# Patient Record
Sex: MALE | Race: WHITE | NOT HISPANIC OR LATINO | URBAN - METROPOLITAN AREA
[De-identification: names, ages, dates, MRNs, and addresses within clinical notes are randomized per-mention and may not be internally consistent; named-entity substitution may affect disease eponyms.]

---

## 2024-03-09 ENCOUNTER — EMERGENCY (EMERGENCY)
Facility: HOSPITAL | Age: 29
LOS: 1 days | Discharge: HOME | End: 2024-03-09
Attending: EMERGENCY MEDICINE | Admitting: EMERGENCY MEDICINE
Payer: COMMERCIAL

## 2024-03-09 VITALS
WEIGHT: 130.07 LBS | HEIGHT: 65 IN | OXYGEN SATURATION: 97 % | DIASTOLIC BLOOD PRESSURE: 69 MMHG | RESPIRATION RATE: 17 BRPM | SYSTOLIC BLOOD PRESSURE: 117 MMHG | HEART RATE: 78 BPM | TEMPERATURE: 98 F

## 2024-03-09 VITALS
HEART RATE: 69 BPM | TEMPERATURE: 97 F | OXYGEN SATURATION: 98 % | DIASTOLIC BLOOD PRESSURE: 60 MMHG | SYSTOLIC BLOOD PRESSURE: 102 MMHG | RESPIRATION RATE: 17 BRPM

## 2024-03-09 DIAGNOSIS — R22.0 LOCALIZED SWELLING, MASS AND LUMP, HEAD: ICD-10-CM

## 2024-03-09 DIAGNOSIS — K11.20 SIALOADENITIS, UNSPECIFIED: ICD-10-CM

## 2024-03-09 LAB
ANION GAP SERPL CALC-SCNC: 11 MMOL/L — SIGNIFICANT CHANGE UP (ref 5–17)
BUN SERPL-MCNC: 18 MG/DL — SIGNIFICANT CHANGE UP (ref 7–23)
CALCIUM SERPL-MCNC: 9.6 MG/DL — SIGNIFICANT CHANGE UP (ref 8.4–10.5)
CHLORIDE SERPL-SCNC: 104 MMOL/L — SIGNIFICANT CHANGE UP (ref 96–108)
CO2 SERPL-SCNC: 26 MMOL/L — SIGNIFICANT CHANGE UP (ref 22–31)
CREAT SERPL-MCNC: 0.98 MG/DL — SIGNIFICANT CHANGE UP (ref 0.5–1.3)
EGFR: 108 ML/MIN/1.73M2 — SIGNIFICANT CHANGE UP
GLUCOSE SERPL-MCNC: 117 MG/DL — HIGH (ref 70–99)
HCT VFR BLD CALC: 39.6 % — SIGNIFICANT CHANGE UP (ref 39–50)
HGB BLD-MCNC: 13.4 G/DL — SIGNIFICANT CHANGE UP (ref 13–17)
MCHC RBC-ENTMCNC: 32.6 PG — SIGNIFICANT CHANGE UP (ref 27–34)
MCHC RBC-ENTMCNC: 33.8 GM/DL — SIGNIFICANT CHANGE UP (ref 32–36)
MCV RBC AUTO: 96.4 FL — SIGNIFICANT CHANGE UP (ref 80–100)
NRBC # BLD: 0 /100 WBCS — SIGNIFICANT CHANGE UP (ref 0–0)
PLATELET # BLD AUTO: 303 K/UL — SIGNIFICANT CHANGE UP (ref 150–400)
POTASSIUM SERPL-MCNC: 3.9 MMOL/L — SIGNIFICANT CHANGE UP (ref 3.5–5.3)
POTASSIUM SERPL-SCNC: 3.9 MMOL/L — SIGNIFICANT CHANGE UP (ref 3.5–5.3)
RBC # BLD: 4.11 M/UL — LOW (ref 4.2–5.8)
RBC # FLD: 12.3 % — SIGNIFICANT CHANGE UP (ref 10.3–14.5)
SODIUM SERPL-SCNC: 141 MMOL/L — SIGNIFICANT CHANGE UP (ref 135–145)
WBC # BLD: 9.27 K/UL — SIGNIFICANT CHANGE UP (ref 3.8–10.5)
WBC # FLD AUTO: 9.27 K/UL — SIGNIFICANT CHANGE UP (ref 3.8–10.5)

## 2024-03-09 PROCEDURE — 80048 BASIC METABOLIC PNL TOTAL CA: CPT

## 2024-03-09 PROCEDURE — 96365 THER/PROPH/DIAG IV INF INIT: CPT | Mod: XU

## 2024-03-09 PROCEDURE — 70491 CT SOFT TISSUE NECK W/DYE: CPT | Mod: 26,MC

## 2024-03-09 PROCEDURE — 99285 EMERGENCY DEPT VISIT HI MDM: CPT

## 2024-03-09 PROCEDURE — 70491 CT SOFT TISSUE NECK W/DYE: CPT | Mod: MC

## 2024-03-09 PROCEDURE — 85027 COMPLETE CBC AUTOMATED: CPT

## 2024-03-09 PROCEDURE — 99284 EMERGENCY DEPT VISIT MOD MDM: CPT | Mod: 25

## 2024-03-09 PROCEDURE — 36415 COLL VENOUS BLD VENIPUNCTURE: CPT

## 2024-03-09 RX ORDER — DEXAMETHASONE 0.5 MG/5ML
10 ELIXIR ORAL ONCE
Refills: 0 | Status: COMPLETED | OUTPATIENT
Start: 2024-03-09 | End: 2024-03-09

## 2024-03-09 RX ADMIN — Medication 10 MILLIGRAM(S): at 04:03

## 2024-03-09 RX ADMIN — Medication 1 TABLET(S): at 04:03

## 2024-03-09 RX ADMIN — Medication 102 MILLIGRAM(S): at 02:36

## 2024-03-09 NOTE — ED ADULT NURSE NOTE - OBJECTIVE STATEMENT
28yM presents to ED with R jaw/facial swelling that started this evening (within the last 2 hours). States "maybe I am having an allergic reaction." Denies any itchiness, rash, or any known allergies. Pt states "it's definitely harder to swallow than before", speaking in full sentences with NAD. Tongue is not swollen.

## 2024-03-09 NOTE — ED PROVIDER NOTE - CLINICAL SUMMARY MEDICAL DECISION MAKING FREE TEXT BOX
right facial swelling, no airway compromise, no resp distress, no voice changes, no crepitus, no tracheal deviation. doubt allergic reaction. possible sialolithiasis, possible abscess  -check labs  -CT  -decadron

## 2024-03-09 NOTE — ED PROVIDER NOTE - PATIENT PORTAL LINK FT
You can access the FollowMyHealth Patient Portal offered by [facility] by registering at the following website: [email or web address]. By joining [facility] FollowMyHealth portal, you will also be able to view your health information using other applications (apps) compatible with our system.

## 2024-03-09 NOTE — ED ADULT NURSE NOTE - NSFALLCONCLUSION_ED_ALL_ED
[employer] radiology results need for outpatient follow-up/return to ED if symptoms worsen, persist or questions arise/radiology results

## 2024-03-09 NOTE — ED PROVIDER NOTE - OBJECTIVE STATEMENT
28M no PMH c/o right facial swelling. pt states started suddenly tonight. states started after he was eating starbursts. states felt like it was pushing on his neck. no drooling, no SOB. no vomiting. no fevers. no trauma.

## 2024-03-09 NOTE — ED PROVIDER NOTE - NSFOLLOWUPINSTRUCTIONS_ED_ALL_ED_FT
Parotitis    Parotitis is inflammation of one or both of the parotid glands. These glands produce saliva. They are found on each side of the face, below and in front of the earlobes. The saliva that they produce comes out of tiny openings (ducts) inside the cheeks.    Parotitis may cause sudden swelling and pain (acute parotitis). It can also cause repeated episodes of swelling and pain or continued swelling that may or may not be painful (chronic parotitis).    What are the causes?  This condition may be caused by:  Infections from bacteria.  Infections from viruses, such as mumps or HIV.  Blockage (obstruction) of saliva flow through the parotid glands. This can be from a stone, scar tissue, or a tumor.  Diseases that cause your body's defense system (immune system) to attack healthy cells in your salivary glands. These are called autoimmune diseases.    What increases the risk?  You are more likely to develop this condition if:  You are 50 years old or older.  You do not drink enough fluids (are dehydrated).  You drink too much alcohol.  You have:  A dry mouth.  Diabetes.  Gout.  A long-term illness.  You do not take good care of your mouth and teeth (poor oral hygiene).  You have had radiation treatments to the head and neck.  You take certain medicines.    What are the signs or symptoms?  Symptoms of this condition depend on the cause. Symptoms may include:  Swelling under and in front of the ear. This may get worse after eating.  Pain and tenderness over the parotid gland. This may get worse after eating.  Redness and warmth of the skin over the parotid gland.  Fever or chills.  Pus coming from the ducts inside the mouth.  Dry mouth.  A bad taste in the mouth.    How is this diagnosed?  This condition may be diagnosed based on:  Your medical history.  A physical exam.  Tests to find the cause of the parotitis. These may include:  Doing blood tests to check for an autoimmune disease or infections from a virus.  Taking a fluid sample from the parotid gland and testing it for infection.  Injecting the ducts of the parotid gland with a dye and then taking X-rays (sialogram).  Having other imaging tests of the gland, such as X-rays, ultrasound, MRI, or CT scan.  Checking the opening of the gland for a stone or obstruction.  Placing a needle into the gland to remove tissue for a biopsy (fine needle aspiration).    How is this treated?  Treatment for this condition depends on the cause. Treatment may include:  Antibiotic medicine for a bacterial infection.  NSAIDs, such as ibuprofen, to treat pain and swelling.  Drinking more fluids.  Removing a stone or obstruction.  Treating an underlying disease that is causing parotitis.  Surgery to drain an infection, remove a growth, or remove the whole gland (parotidectomy).  Treatment may not be needed if parotid swelling goes away with home care.    Follow these instructions at home:  Medicines    A prescription pill bottle with an example of a pill.  Take over-the-counter and prescription medicines only as told by your health care provider.  If you were prescribed an antibiotic medicine, take it as told by your health care provider. Do not stop taking the antibiotic even if you start to feel better.    Managing pain and swelling    If directed, apply heat to the affected area as often as told by your health care provider. Use the heat source that your health care provider recommends, such as a moist heat pack or a heating pad.  Place a towel between your skin and the heat source.  Leave the heat on for 20–30 minutes.  Remove the heat if your skin turns bright red. This is especially important if you are unable to feel pain, heat, or cold. You have a greater risk of getting burned.  Gargle with a mixture of salt and water 3–4 times a day or as needed. To make salt water, completely dissolve ½–1 tsp (3–6 g) of salt in 1 cup (237 mL) of warm water.  Gently massage the parotid glands as told by your health care provider.    General instructions    Using a toothbrush to brush the front and back sides of the teeth.  Drink enough fluid to keep your urine pale yellow.  Keep your mouth clean and moist.  Try sucking on sour candy. This may help to make your mouth less dry by stimulating the flow of saliva.  Maintain good oral health.  Brush your teeth at least two times a day.  Floss your teeth every day.  See your dentist regularly.  Do not use any products that contain nicotine or tobacco. These products include cigarettes, chewing tobacco, and vaping devices, such as e-cigarettes. If you need help quitting, ask your health care provider.  Do not drink alcohol.  Keep all follow-up visits. This is important.    Contact a health care provider if:  You have a fever or chills.  You have new symptoms.  Your symptoms get worse.  Your symptoms do not improve with treatment.    Get help right away if:  You have difficulty breathing or swallowing because of the swollen gland.  These symptoms may represent a serious problem that is an emergency. Do not wait to see if the symptoms will go away. Get medical help right away. Call your local emergency services (911 in the [place] Do not drive yourself to the hospital.    Summary  Parotitis is inflammation of one or both of the parotid glands.  Symptoms include pain and swelling under and in front of the ear. They may also include a fever and a bad taste in your mouth.  This condition may be treated with antibiotics, NSAIDs, increasing fluids, or surgery.  In some cases, parotitis may go away on its own without treatment.  You should drink plenty of fluids, maintain good oral health, and do not use products that contain nicotine or tobacco.  This information is not intended to replace advice given to you by your health care provider. Make sure you discuss any questions you have with your health care provider.

## 2024-03-09 NOTE — ED ADULT NURSE NOTE - NSFALLUNIVINTERV_ED_ALL_ED
Bed/Stretcher in lowest position, wheels locked, appropriate side rails in place/Call bell, personal items and telephone in reach/Instruct patient to call for assistance before getting out of bed/chair/stretcher/Non-slip footwear applied when patient is off stretcher/[place] to call system/Physically safe environment - no spills, clutter or unnecessary equipment/Purposeful proactive rounding/Room/bathroom lighting operational, light cord in reach

## 2024-03-09 NOTE — ED PROVIDER NOTE - PROGRESS NOTE DETAILS
parotitis on CT - will treat with augmentin  I have discussed the discharge plan with the patient. The patient agrees with the plan, as discussed.  The patient understands Emergency Department diagnosis is a preliminary diagnosis often based on limited information and that the patient must adhere to the follow-up plan as discussed.  The patient understands that if the symptoms worsen or if prescribed medications do not have the desired/planned effect that the patient may return to the Emergency Department at any time for further evaluation and treatment.

## 2025-07-17 NOTE — ED PROVIDER NOTE - THROAT FINDINGS
Consent: Written consent obtained, risks reviewed including but not limited to crusting, scabbing, blistering, scarring, darker or lighter pigmentary change, bruising, and/or incomplete response. Handpiece (Optional): 532 nm Spot Size (Optional): 5 mm Pre-Procedure: The treatment areas identified by the patient were cleansed and treatment was performed with the parameters mentioned above. Anesthesia Type: 1% lidocaine with epinephrine Price (Use Numbers Only, No Special Characters Or $): 50 Treatment Number: 1 Post-Procedure: Following the procedure the post care instructions were reviewed with the patient. Post-Care Instructions: I reviewed with the patient in detail post-care instructions. Patient should stay away from the sun and wear sun protection until treated areas are fully healed. Detail Level: Zone no exudate/no redness/uvula midline/NO DROOLING/NO TONGUE ELEVATION/NO STRIDOR